# Patient Record
Sex: MALE | Race: WHITE | NOT HISPANIC OR LATINO | Employment: FULL TIME | URBAN - NONMETROPOLITAN AREA
[De-identification: names, ages, dates, MRNs, and addresses within clinical notes are randomized per-mention and may not be internally consistent; named-entity substitution may affect disease eponyms.]

---

## 2018-01-31 ENCOUNTER — APPOINTMENT (OUTPATIENT)
Dept: CHIROPRACTIC MEDICINE | Facility: OTHER | Age: 43
End: 2018-01-31

## 2018-01-31 ENCOUNTER — OFFICE VISIT (OUTPATIENT)
Dept: CHIROPRACTIC MEDICINE | Facility: OTHER | Age: 43
End: 2018-01-31
Attending: CHIROPRACTOR
Payer: COMMERCIAL

## 2018-01-31 ENCOUNTER — APPOINTMENT (OUTPATIENT)
Dept: OCCUPATIONAL MEDICINE | Facility: OTHER | Age: 43
End: 2018-01-31

## 2018-01-31 DIAGNOSIS — M54.50 ACUTE RIGHT-SIDED LOW BACK PAIN WITHOUT SCIATICA: ICD-10-CM

## 2018-01-31 DIAGNOSIS — M99.03 SEGMENTAL AND SOMATIC DYSFUNCTION OF LUMBAR REGION: Primary | ICD-10-CM

## 2018-01-31 DIAGNOSIS — M99.01 SEGMENTAL AND SOMATIC DYSFUNCTION OF CERVICAL REGION: ICD-10-CM

## 2018-01-31 DIAGNOSIS — M99.02 SEGMENTAL AND SOMATIC DYSFUNCTION OF THORACIC REGION: ICD-10-CM

## 2018-01-31 PROCEDURE — 98941 CHIROPRACT MANJ 3-4 REGIONS: CPT | Mod: AT | Performed by: CHIROPRACTOR

## 2018-01-31 PROCEDURE — 99212 OFFICE O/P EST SF 10 MIN: CPT | Mod: 25 | Performed by: CHIROPRACTOR

## 2018-01-31 PROCEDURE — 99499 UNLISTED E&M SERVICE: CPT

## 2018-01-31 NOTE — PROGRESS NOTES
Subjective Finding:    Chief compalint: Patient presents with:  Back Pain: right low back pain  Neck Pain  , Pain Scale: 4/10, Intensity: sharp, Duration: 1 weeks, Change since last visit: better, Radiating: no.    Date of injury:     Activities that the pain restricts:   Home/household activities: no.  Work duties: no.  Hobbies/social: no.  Sleep: no.  Makes symptoms better: rest and laying down.  Makes symptoms worse: activity, lumbar extension and lumbar flexion.  Have you seen anyone else for the symptoms? No.  Work related: no.  Automobile related injury: no.    Objective and Assessment:    Posture Analysis:   High shoulder: right.  Head tilt: .  High iliac crest: right.  Head carriage: forward.  Thoracic Kyphosis: neutral.  Lumbar Lordosis: neutral.    Lumbar Range of Motion: flexion decreased and extension decreased.  Cervical Range of Motion: flexion decreased.  Thoracic Range of Motion: .  Extremity Range of Motion: .    Palpation:   Quad lumb: right, referred pain: no  Lev scapulae: dull pain, referred pain: no    Segmental dysfunction pre-treatment: C2, T2 and L5.  C6-7    Assessment post-treatment:  Cervical: ROM increased.  Thoracic: ROM increased.  Lumbar: ROM increased and muscle spasm decreased.    Comments: .      Complicating Factors: .    Plan / Procedure:    Expected release date: .  Treatment plan: PRN.  Instructed patient: light duty.  Short term goals: reduce pain.  Long term goals: restore normal function.  Prognosis: excellent.

## 2018-01-31 NOTE — MR AVS SNAPSHOT
After Visit Summary   1/31/2018    Asa Sahu    MRN: [id number]           Patient Information     Date Of Birth          1975        Visit Information        Provider Department      1/31/2018 1:40 PM Nimesh Alex DC  [facility]        Today's Diagnoses     Segmental and somatic dysfunction of lumbar region    -  1    Acute right-sided low back pain without sciatica        Segmental and somatic dysfunction of thoracic region        Segmental and somatic dysfunction of cervical region           Follow-ups after your visit        Who to contact     If you have questions or need follow up information about today's clinic visit or your schedule please contact  [facility] directly at [phone].  Normal or non-critical lab and imaging results will be communicated to you by WeTOWN[employer]art, letter or phone within 4 business days after the clinic has received the results. If you do not hear from us within 7 days, please contact the clinic through WeTOWN[employer]art or phone. If you have a critical or abnormal lab result, we will notify you by phone as soon as possible.  Submit refill requests through [employer] or call your pharmacy and they will forward the refill request to us. Please allow 3 business days for your refill to be completed.          Additional Information About Your Visit        MyChart Information     [employer] gives you secure access to your electronic health record. If you see a primary care provider, you can also send messages to your care team and make appointments. If you have questions, please call your primary care clinic.  If you do not have a primary care provider, please call [phone] and they will assist you.        Care EveryWhere ID     This is your Care EveryWhere ID. This could be used by other organizations to access your [facility] medical records  [id number]         Blood Pressure from Last 3 Encounters:   08/03/16 121/73   05/16/16 117/72   12/03/14  126/80    Weight from Last 3 Encounters:   08/03/16 190 lb (86.2 kg)   05/16/16 198 lb (89.8 kg)   12/03/14 205 lb (93 kg)              We Performed the Following     CHIROPRAC MANIP,SPINAL,3-4 REGIONS        Primary Care Provider Office Phone # Fax #    Tia Guzmán [phone]       [facility] [place] [address]  [place] [address]        Equal Access to Services     [facility]: Hadii aad ku hadasho Soomaali, waaxda luqadaha, qaybta kaalmada adeegyada, waxay idiin hayaan adeeg kharash laleeanne . So [facility] [phone].    ATENCIÓN: Si habla español, tiene a mercado disposición servicios gratuitos de asistencia lingüística. [facility] [phone].    We comply with applicable federal civil rights laws and [place] laws. We do not discriminate on the basis of race, color, national origin, age, disability, sex, sexual orientation, or gender identity.            Thank you!     Thank you for choosing  [facility] [place] [facility]  for your care. Our goal is always to provide you with excellent care. Hearing back from our patients is one way we can continue to improve our services. Please take a few minutes to complete the written survey that you may receive in the mail after your visit with us. Thank you!             Your Updated Medication List - Protect others around you: Learn how to safely use, store and throw away your medicines at www.disposemymeds.org.          This list is accurate as of 1/31/18  1:48 PM.  Always use your most recent med list.                   Brand Name Dispense Instructions for use Diagnosis    TYLENOL 8 HOUR PO      Take by mouth as needed        valACYclovir 500 MG tablet    VALTREX    6 tablet    TAKE 1 TABLET BY MOUTH TWICE DAILY.    Herpes simplex without complication       VITAMIN C PO

## 2020-01-24 ENCOUNTER — APPOINTMENT (OUTPATIENT)
Dept: CHIROPRACTIC MEDICINE | Facility: OTHER | Age: 45
End: 2020-01-24

## 2020-01-24 ENCOUNTER — APPOINTMENT (OUTPATIENT)
Dept: OCCUPATIONAL MEDICINE | Facility: OTHER | Age: 45
End: 2020-01-24

## 2020-01-24 ENCOUNTER — OFFICE VISIT (OUTPATIENT)
Dept: CHIROPRACTIC MEDICINE | Facility: OTHER | Age: 45
End: 2020-01-24
Attending: CHIROPRACTOR
Payer: COMMERCIAL

## 2020-01-24 DIAGNOSIS — M99.02 SEGMENTAL AND SOMATIC DYSFUNCTION OF THORACIC REGION: ICD-10-CM

## 2020-01-24 DIAGNOSIS — M54.50 ACUTE BILATERAL LOW BACK PAIN WITHOUT SCIATICA: ICD-10-CM

## 2020-01-24 DIAGNOSIS — M99.01 SEGMENTAL AND SOMATIC DYSFUNCTION OF CERVICAL REGION: ICD-10-CM

## 2020-01-24 DIAGNOSIS — M99.03 SEGMENTAL AND SOMATIC DYSFUNCTION OF LUMBAR REGION: Primary | ICD-10-CM

## 2020-01-24 PROCEDURE — 98941 CHIROPRACT MANJ 3-4 REGIONS: CPT | Mod: AT | Performed by: CHIROPRACTOR

## 2020-01-24 PROCEDURE — 99499 UNLISTED E&M SERVICE: CPT

## 2020-01-24 NOTE — PROGRESS NOTES
Subjective Finding:    Chief compalint: Patient presents with:  Back Pain  Neck Pain  , Pain Scale: 4/10, Intensity: sharp, Duration: 1 weeks, Change since last visit: better, Radiating: no.    Date of injury:     Activities that the pain restricts:   Home/household activities: no.  Work duties: no.  Hobbies/social: no.  Sleep: no.  Makes symptoms better: rest and laying down.  Makes symptoms worse: activity, lumbar extension and lumbar flexion.  Have you seen anyone else for the symptoms? No.  Work related: no.  Automobile related injury: no.    Objective and Assessment:    Posture Analysis:   High shoulder: right.  Head tilt: .  High iliac crest: right.  Head carriage: forward.  Thoracic Kyphosis: neutral.  Lumbar Lordosis: neutral.    Lumbar Range of Motion: flexion decreased and extension decreased.  Cervical Range of Motion: flexion decreased.  Thoracic Range of Motion: .  Extremity Range of Motion: .    Palpation:   Quad lumb: right, referred pain: no  Lev scapulae: dull pain, referred pain: no    Segmental dysfunction pre-treatment: C2, T2 and L5.  C6-7    Assessment post-treatment:  Cervical: ROM increased.  Thoracic: ROM increased.  Lumbar: ROM increased and muscle spasm decreased.    Comments: .      Complicating Factors: .    Plan / Procedure:    Expected release date: .  Treatment plan: PRN.  Instructed patient: light duty.  Short term goals: reduce pain.  Long term goals: restore normal function.  Prognosis: excellent.

## 2020-03-02 ENCOUNTER — HEALTH MAINTENANCE LETTER (OUTPATIENT)
Age: 45
End: 2020-03-02

## 2020-08-17 DIAGNOSIS — B00.9 HERPES SIMPLEX WITHOUT COMPLICATION: ICD-10-CM

## 2020-08-17 RX ORDER — VALACYCLOVIR HYDROCHLORIDE 500 MG/1
TABLET, FILM COATED ORAL
Qty: 6 TABLET | Refills: 0 | OUTPATIENT
Start: 2020-08-17

## 2020-08-17 NOTE — TELEPHONE ENCOUNTER
valtrex      Last Written Prescription Date:  8/3/16  Last Fill Quantity: 6,   # refills: 3  Last Office Visit: 8/3/16  Future Office visit:       Routing refill request to provider for review/approval because:  Drug not on the [facility], [facility] or [facility] refill protocol or controlled substance

## 2020-12-20 ENCOUNTER — HEALTH MAINTENANCE LETTER (OUTPATIENT)
Age: 45
End: 2020-12-20

## 2021-04-18 ENCOUNTER — HEALTH MAINTENANCE LETTER (OUTPATIENT)
Age: 46
End: 2021-04-18

## 2021-06-11 ENCOUNTER — OFFICE VISIT (OUTPATIENT)
Dept: CHIROPRACTIC MEDICINE | Facility: OTHER | Age: 46
End: 2021-06-11
Attending: CHIROPRACTOR
Payer: COMMERCIAL

## 2021-06-11 DIAGNOSIS — M99.01 SEGMENTAL AND SOMATIC DYSFUNCTION OF CERVICAL REGION: ICD-10-CM

## 2021-06-11 DIAGNOSIS — M99.03 SEGMENTAL AND SOMATIC DYSFUNCTION OF LUMBAR REGION: Primary | ICD-10-CM

## 2021-06-11 DIAGNOSIS — M54.50 ACUTE BILATERAL LOW BACK PAIN WITHOUT SCIATICA: ICD-10-CM

## 2021-06-11 DIAGNOSIS — M99.02 SEGMENTAL AND SOMATIC DYSFUNCTION OF THORACIC REGION: ICD-10-CM

## 2021-06-11 PROCEDURE — 98941 CHIROPRACT MANJ 3-4 REGIONS: CPT | Mod: AT | Performed by: CHIROPRACTOR

## 2021-06-22 ENCOUNTER — OFFICE VISIT (OUTPATIENT)
Dept: CHIROPRACTIC MEDICINE | Facility: OTHER | Age: 46
End: 2021-06-22
Attending: CHIROPRACTOR
Payer: COMMERCIAL

## 2021-06-22 DIAGNOSIS — M54.50 ACUTE BILATERAL LOW BACK PAIN WITHOUT SCIATICA: ICD-10-CM

## 2021-06-22 DIAGNOSIS — M99.02 SEGMENTAL AND SOMATIC DYSFUNCTION OF THORACIC REGION: ICD-10-CM

## 2021-06-22 DIAGNOSIS — M99.03 SEGMENTAL AND SOMATIC DYSFUNCTION OF LUMBAR REGION: Primary | ICD-10-CM

## 2021-06-22 DIAGNOSIS — M99.01 SEGMENTAL AND SOMATIC DYSFUNCTION OF CERVICAL REGION: ICD-10-CM

## 2021-06-22 PROCEDURE — 98941 CHIROPRACT MANJ 3-4 REGIONS: CPT | Mod: AT | Performed by: CHIROPRACTOR

## 2021-10-03 ENCOUNTER — HEALTH MAINTENANCE LETTER (OUTPATIENT)
Age: 46
End: 2021-10-03

## 2021-11-17 ENCOUNTER — OFFICE VISIT (OUTPATIENT)
Dept: CHIROPRACTIC MEDICINE | Facility: OTHER | Age: 46
End: 2021-11-17
Attending: CHIROPRACTOR
Payer: COMMERCIAL

## 2021-11-17 DIAGNOSIS — M99.03 SEGMENTAL AND SOMATIC DYSFUNCTION OF LUMBAR REGION: Primary | ICD-10-CM

## 2021-11-17 DIAGNOSIS — M54.50 ACUTE BILATERAL LOW BACK PAIN WITHOUT SCIATICA: ICD-10-CM

## 2021-11-17 DIAGNOSIS — M99.02 SEGMENTAL AND SOMATIC DYSFUNCTION OF THORACIC REGION: ICD-10-CM

## 2021-11-17 DIAGNOSIS — M99.01 SEGMENTAL AND SOMATIC DYSFUNCTION OF CERVICAL REGION: ICD-10-CM

## 2021-11-17 PROCEDURE — 98941 CHIROPRACT MANJ 3-4 REGIONS: CPT | Mod: AT | Performed by: CHIROPRACTOR

## 2021-11-17 NOTE — PROGRESS NOTES
Subjective Finding:    Chief compalint: Patient presents with:  Back Pain  , Pain Scale: 4/10, Intensity: sharp, Duration: 1 weeks, Change since last visit: better, Radiating: no.    Date of injury:     Activities that the pain restricts:   Home/household activities: no.  Work duties: no.  Hobbies/social: no.  Sleep: no.  Makes symptoms better: rest and laying down.  Makes symptoms worse: activity, lumbar extension and lumbar flexion.  Have you seen anyone else for the symptoms? No.  Work related: no.  Automobile related injury: no.    Objective and Assessment:    Posture Analysis:   High shoulder: right.  Head tilt: .  High iliac crest: right.  Head carriage: forward.  Thoracic Kyphosis: neutral.  Lumbar Lordosis: neutral.    Lumbar Range of Motion: flexion decreased and extension decreased.  Cervical Range of Motion: flexion decreased.  Thoracic Range of Motion: .  Extremity Range of Motion: .    Palpation:   Quad lumb: right, referred pain: no  Lev scapulae: dull pain, referred pain: no    Segmental dysfunction pre-treatment: C2, T2 and L5.  C6-7    Assessment post-treatment:  Cervical: ROM increased.  Thoracic: ROM increased.  Lumbar: ROM increased and muscle spasm decreased.    Comments: .      Complicating Factors: .    Plan / Procedure:    Expected release date: .  Treatment plan: PRN.  Instructed patient: light duty.  Short term goals: reduce pain.  Long term goals: restore normal function.  Prognosis: excellent.

## 2021-11-22 ENCOUNTER — OFFICE VISIT (OUTPATIENT)
Dept: CHIROPRACTIC MEDICINE | Facility: OTHER | Age: 46
End: 2021-11-22
Attending: CHIROPRACTOR
Payer: COMMERCIAL

## 2021-11-22 DIAGNOSIS — M99.03 SEGMENTAL AND SOMATIC DYSFUNCTION OF LUMBAR REGION: Primary | ICD-10-CM

## 2021-11-22 DIAGNOSIS — M99.01 SEGMENTAL AND SOMATIC DYSFUNCTION OF CERVICAL REGION: ICD-10-CM

## 2021-11-22 DIAGNOSIS — M99.02 SEGMENTAL AND SOMATIC DYSFUNCTION OF THORACIC REGION: ICD-10-CM

## 2021-11-22 DIAGNOSIS — M54.50 ACUTE BILATERAL LOW BACK PAIN WITHOUT SCIATICA: ICD-10-CM

## 2021-11-22 PROCEDURE — 98941 CHIROPRACT MANJ 3-4 REGIONS: CPT | Mod: AT | Performed by: CHIROPRACTOR

## 2022-01-18 ENCOUNTER — APPOINTMENT (OUTPATIENT)
Dept: CHIROPRACTIC MEDICINE | Facility: OTHER | Age: 47
End: 2022-01-18

## 2022-01-18 ENCOUNTER — APPOINTMENT (OUTPATIENT)
Dept: OCCUPATIONAL MEDICINE | Facility: OTHER | Age: 47
End: 2022-01-18

## 2022-01-18 ENCOUNTER — OFFICE VISIT (OUTPATIENT)
Dept: CHIROPRACTIC MEDICINE | Facility: OTHER | Age: 47
End: 2022-01-18
Attending: CHIROPRACTOR
Payer: COMMERCIAL

## 2022-01-18 DIAGNOSIS — M54.50 ACUTE BILATERAL LOW BACK PAIN WITHOUT SCIATICA: ICD-10-CM

## 2022-01-18 DIAGNOSIS — M99.01 SEGMENTAL AND SOMATIC DYSFUNCTION OF CERVICAL REGION: ICD-10-CM

## 2022-01-18 DIAGNOSIS — M99.03 SEGMENTAL AND SOMATIC DYSFUNCTION OF LUMBAR REGION: Primary | ICD-10-CM

## 2022-01-18 DIAGNOSIS — M99.02 SEGMENTAL AND SOMATIC DYSFUNCTION OF THORACIC REGION: ICD-10-CM

## 2022-01-18 PROCEDURE — 99499 UNLISTED E&M SERVICE: CPT

## 2022-01-18 PROCEDURE — 99212 OFFICE O/P EST SF 10 MIN: CPT | Mod: 25 | Performed by: CHIROPRACTOR

## 2022-01-18 PROCEDURE — 98941 CHIROPRACT MANJ 3-4 REGIONS: CPT | Mod: AT | Performed by: CHIROPRACTOR

## 2022-05-14 ENCOUNTER — HEALTH MAINTENANCE LETTER (OUTPATIENT)
Age: 47
End: 2022-05-14

## 2022-09-04 ENCOUNTER — HEALTH MAINTENANCE LETTER (OUTPATIENT)
Age: 47
End: 2022-09-04

## 2023-01-09 ENCOUNTER — APPOINTMENT (OUTPATIENT)
Dept: OCCUPATIONAL MEDICINE | Facility: OTHER | Age: 48
End: 2023-01-09

## 2023-01-09 ENCOUNTER — OFFICE VISIT (OUTPATIENT)
Dept: CHIROPRACTIC MEDICINE | Facility: OTHER | Age: 48
End: 2023-01-09
Attending: CHIROPRACTOR
Payer: COMMERCIAL

## 2023-01-09 ENCOUNTER — APPOINTMENT (OUTPATIENT)
Dept: CHIROPRACTIC MEDICINE | Facility: OTHER | Age: 48
End: 2023-01-09

## 2023-01-09 DIAGNOSIS — M54.50 ACUTE BILATERAL LOW BACK PAIN WITHOUT SCIATICA: ICD-10-CM

## 2023-01-09 DIAGNOSIS — M99.02 SEGMENTAL AND SOMATIC DYSFUNCTION OF THORACIC REGION: ICD-10-CM

## 2023-01-09 DIAGNOSIS — M99.03 SEGMENTAL AND SOMATIC DYSFUNCTION OF LUMBAR REGION: Primary | ICD-10-CM

## 2023-01-09 DIAGNOSIS — M99.01 SEGMENTAL AND SOMATIC DYSFUNCTION OF CERVICAL REGION: ICD-10-CM

## 2023-01-09 PROCEDURE — 99499 UNLISTED E&M SERVICE: CPT

## 2023-01-09 PROCEDURE — 98941 CHIROPRACT MANJ 3-4 REGIONS: CPT | Mod: AT | Performed by: CHIROPRACTOR

## 2023-01-09 PROCEDURE — 99212 OFFICE O/P EST SF 10 MIN: CPT | Mod: 25 | Performed by: CHIROPRACTOR

## 2023-03-21 ENCOUNTER — OFFICE VISIT (OUTPATIENT)
Dept: CHIROPRACTIC MEDICINE | Facility: OTHER | Age: 48
End: 2023-03-21
Attending: CHIROPRACTOR
Payer: COMMERCIAL

## 2023-03-21 DIAGNOSIS — M99.03 SEGMENTAL AND SOMATIC DYSFUNCTION OF LUMBAR REGION: Primary | ICD-10-CM

## 2023-03-21 DIAGNOSIS — M99.02 SEGMENTAL AND SOMATIC DYSFUNCTION OF THORACIC REGION: ICD-10-CM

## 2023-03-21 DIAGNOSIS — M99.01 SEGMENTAL AND SOMATIC DYSFUNCTION OF CERVICAL REGION: ICD-10-CM

## 2023-03-21 DIAGNOSIS — M54.50 ACUTE BILATERAL LOW BACK PAIN WITHOUT SCIATICA: ICD-10-CM

## 2023-03-21 PROCEDURE — 98941 CHIROPRACT MANJ 3-4 REGIONS: CPT | Mod: AT | Performed by: CHIROPRACTOR

## 2023-03-23 ENCOUNTER — OFFICE VISIT (OUTPATIENT)
Dept: CHIROPRACTIC MEDICINE | Facility: OTHER | Age: 48
End: 2023-03-23
Attending: CHIROPRACTOR
Payer: COMMERCIAL

## 2023-03-23 DIAGNOSIS — M54.50 ACUTE BILATERAL LOW BACK PAIN WITHOUT SCIATICA: ICD-10-CM

## 2023-03-23 DIAGNOSIS — M99.01 SEGMENTAL AND SOMATIC DYSFUNCTION OF CERVICAL REGION: ICD-10-CM

## 2023-03-23 DIAGNOSIS — M99.03 SEGMENTAL AND SOMATIC DYSFUNCTION OF LUMBAR REGION: Primary | ICD-10-CM

## 2023-03-23 DIAGNOSIS — M99.02 SEGMENTAL AND SOMATIC DYSFUNCTION OF THORACIC REGION: ICD-10-CM

## 2023-03-23 PROCEDURE — 98941 CHIROPRACT MANJ 3-4 REGIONS: CPT | Mod: AT | Performed by: CHIROPRACTOR

## 2023-03-23 NOTE — PROGRESS NOTES
Subjective Finding:    Chief compalint: Patient presents with:  Back Pain  , Pain Scale: 4/10, Intensity: sharp, Duration: 1 weeks, Change since last visit: better, Radiating: no.    Date of injury:     Activities that the pain restricts:   Home/household activities: no.  Work duties: no.  Hobbies/social: no.  Sleep: no.  Makes symptoms better: rest and laying down.  Makes symptoms worse: activity, lumbar extension and lumbar flexion.  Have you seen anyone else for the symptoms? No.  Work related: no.  Automobile related injury: no.    Objective and Assessment:    Posture Analysis:   High shoulder: right.  Head tilt: .  High iliac crest: right.  Head carriage: forward.  Thoracic Kyphosis: neutral.  Lumbar Lordosis: neutral.    Lumbar Range of Motion: flexion decreased and extension decreased.  Cervical Range of Motion: flexion decreased.  Thoracic Range of Motion: .  Extremity Range of Motion: .    Palpation:   Quad lumb: right, referred pain: no  Lev scapulae: dull pain, referred pain: no    Segmental dysfunction pre-treatment: C2, T2 and L5.  C6-7    Assessment post-treatment:  Cervical: ROM increased.  Thoracic: ROM increased.  Lumbar: ROM increased and muscle spasm decreased.    Comments: .      Complicating Factors: .    Plan / Procedure:  25662 Chiropractic manipulative treatment 3-4 regions performed   Cervical: Diversified, See above for level, Supine, Thoracic: Diversified, See above for level, Prone and Lumbar: Diversified, See above for level, Side posture    Expected release date: .  Treatment plan: PRN.  Instructed patient: light duty.  Short term goals: reduce pain.  Long term goals: restore normal function.  Prognosis: excellent.

## 2023-06-03 ENCOUNTER — HEALTH MAINTENANCE LETTER (OUTPATIENT)
Age: 48
End: 2023-06-03

## 2024-01-02 ENCOUNTER — APPOINTMENT (OUTPATIENT)
Dept: OCCUPATIONAL MEDICINE | Facility: OTHER | Age: 49
End: 2024-01-02

## 2024-01-02 ENCOUNTER — OFFICE VISIT (OUTPATIENT)
Dept: CHIROPRACTIC MEDICINE | Facility: OTHER | Age: 49
End: 2024-01-02
Attending: CHIROPRACTOR
Payer: COMMERCIAL

## 2024-01-02 ENCOUNTER — APPOINTMENT (OUTPATIENT)
Dept: CHIROPRACTIC MEDICINE | Facility: OTHER | Age: 49
End: 2024-01-02

## 2024-01-02 DIAGNOSIS — M99.03 SEGMENTAL AND SOMATIC DYSFUNCTION OF LUMBAR REGION: Primary | ICD-10-CM

## 2024-01-02 DIAGNOSIS — M99.02 SEGMENTAL AND SOMATIC DYSFUNCTION OF THORACIC REGION: ICD-10-CM

## 2024-01-02 DIAGNOSIS — M54.50 ACUTE BILATERAL LOW BACK PAIN WITHOUT SCIATICA: ICD-10-CM

## 2024-01-02 PROCEDURE — 99499 UNLISTED E&M SERVICE: CPT | Performed by: CHIROPRACTOR

## 2024-01-02 PROCEDURE — 98940 CHIROPRACT MANJ 1-2 REGIONS: CPT | Mod: AT | Performed by: CHIROPRACTOR

## 2024-01-02 NOTE — PROGRESS NOTES
Subjective Finding:    Chief compalint: Patient presents with:  Back Pain: Back stiffness   , Pain Scale: 4/10, Intensity: dull, Duration: 2 weeks, Radiating: no.    Date of injury:     Activities that the pain restricts:   Home/household/hobbies/social activities: Yes.  Work duties: No.  Sleep: No.  Makes symptoms better: rest.  Makes symptoms worse: lumbar extension and lumbar flexion.  Have you seen anyone else for the symptoms? No.  Work related: No.  Automobile related injury: No.    Objective and Assessment:    Posture Analysis:   High shoulder: .  Head tilt: .  High iliac crest: .  Head carriage: neutral.  Thoracic Kyphosis: neutral.  Lumbar Lordosis: forward.    Lumbar Range of Motion: extension decreased.  Cervical Range of Motion: .  Thoracic Range of Motion: .  Extremity Range of Motion: .    Palpation:   Quad lumb: bilateral, referred pain: no    Segmental dysfunction pre-treatment and treatment area: T7, L4, and L5.    Assessment post-treatment:  Cervical: .  Thoracic: ROM increased.  Lumbar: ROM increased.    Comments: .      Complicating Factors: .    Procedure(s):  CMT:  48327 Chiropractic manipulative treatment 2 regions performed   Thoracic: Diversified, See above for level, Prone and Lumbar: Diversified, See above for level, Side posture    Modalities:  None performed this visit    Therapeutic procedures:  None    Plan:  Treatment plan: PRN.  Instructed patient: stretch as instructed at visit.  Short term goals: reduce pain.  Long term goals: restore normal function.  Prognosis: excellent.

## 2024-04-23 ENCOUNTER — OFFICE VISIT (OUTPATIENT)
Dept: CHIROPRACTIC MEDICINE | Facility: OTHER | Age: 49
End: 2024-04-23
Attending: CHIROPRACTOR
Payer: COMMERCIAL

## 2024-04-23 DIAGNOSIS — M99.02 SEGMENTAL AND SOMATIC DYSFUNCTION OF THORACIC REGION: ICD-10-CM

## 2024-04-23 DIAGNOSIS — M54.50 ACUTE BILATERAL LOW BACK PAIN WITHOUT SCIATICA: ICD-10-CM

## 2024-04-23 DIAGNOSIS — M99.03 SEGMENTAL AND SOMATIC DYSFUNCTION OF LUMBAR REGION: Primary | ICD-10-CM

## 2024-04-23 DIAGNOSIS — M99.01 SEGMENTAL AND SOMATIC DYSFUNCTION OF CERVICAL REGION: ICD-10-CM

## 2024-04-23 PROCEDURE — 98941 CHIROPRACT MANJ 3-4 REGIONS: CPT | Mod: AT | Performed by: CHIROPRACTOR

## 2024-04-23 NOTE — PROGRESS NOTES
Subjective Finding:    Chief compalint: Patient presents with:  Neck Pain: With back pain   , Pain Scale: 5/10, Intensity: sharp, Duration: 1 weeks, Radiating: bilateral buttock.    Date of injury:     Activities that the pain restricts:   Home/household/hobbies/social activities: Yes.  Work duties: Yes.  Sleep: No.  Makes symptoms better: rest.  Makes symptoms worse: lumbar extension and lumbar flexion.  Have you seen anyone else for the symptoms? No.  Work related: No.  Automobile related injury: No.    Objective and Assessment:    Posture Analysis:   High shoulder: .  Head tilt: .  High iliac crest: .  Head carriage: neutral.  Thoracic Kyphosis: neutral.  Lumbar Lordosis: forward.    Lumbar Range of Motion: extension decreased.  Cervical Range of Motion: .  Thoracic Range of Motion: .  Extremity Range of Motion: .    Palpation:   Quad lumb: bilateral, referred pain: no    Segmental dysfunction pre-treatment and treatment area: C6, T5, L4, and L5.    Assessment post-treatment:  Cervical: ROM increased.  Thoracic: ROM increased.  Lumbar: ROM increased.    Comments: .      Complicating Factors: .    Procedure(s):  CMT:  66319 Chiropractic manipulative treatment 3-4 regions performed   Cervical: Diversified, See above for level, Supine, Thoracic: Diversified, See above for level, Prone, and Lumbar: Diversified, See above for level, Side posture    Modalities:  None performed this visit    Therapeutic procedures:  None    Plan:  Treatment plan: PRN.  Instructed patient: walk 10 minutes.  Short term goals: increase ROM.  Long term goals: restore normal function.  Prognosis: very good.

## 2024-07-07 ENCOUNTER — HEALTH MAINTENANCE LETTER (OUTPATIENT)
Age: 49
End: 2024-07-07

## 2025-07-13 ENCOUNTER — HEALTH MAINTENANCE LETTER (OUTPATIENT)
Age: 50
End: 2025-07-13